# Patient Record
Sex: MALE | Race: WHITE | HISPANIC OR LATINO | URBAN - METROPOLITAN AREA
[De-identification: names, ages, dates, MRNs, and addresses within clinical notes are randomized per-mention and may not be internally consistent; named-entity substitution may affect disease eponyms.]

---

## 2018-03-31 ENCOUNTER — EMERGENCY (EMERGENCY)
Facility: HOSPITAL | Age: 83
LOS: 1 days | Discharge: HOME | End: 2018-03-31
Attending: EMERGENCY MEDICINE | Admitting: EMERGENCY MEDICINE
Payer: COMMERCIAL

## 2018-03-31 VITALS
RESPIRATION RATE: 18 BRPM | SYSTOLIC BLOOD PRESSURE: 133 MMHG | TEMPERATURE: 98 F | DIASTOLIC BLOOD PRESSURE: 64 MMHG | HEART RATE: 78 BPM | OXYGEN SATURATION: 97 %

## 2018-03-31 VITALS
HEART RATE: 66 BPM | DIASTOLIC BLOOD PRESSURE: 74 MMHG | RESPIRATION RATE: 14 BRPM | TEMPERATURE: 98 F | SYSTOLIC BLOOD PRESSURE: 158 MMHG | OXYGEN SATURATION: 99 %

## 2018-03-31 LAB
ALBUMIN SERPL ELPH-MCNC: 4 G/DL — SIGNIFICANT CHANGE UP (ref 3.3–5)
ALP SERPL-CCNC: 16 U/L — LOW (ref 40–120)
ALT FLD-CCNC: 13 U/L RC — SIGNIFICANT CHANGE UP (ref 10–45)
ANION GAP SERPL CALC-SCNC: 12 MMOL/L — SIGNIFICANT CHANGE UP (ref 5–17)
AST SERPL-CCNC: 16 U/L — SIGNIFICANT CHANGE UP (ref 10–40)
BASOPHILS # BLD AUTO: 0 K/UL — SIGNIFICANT CHANGE UP (ref 0–0.2)
BASOPHILS NFR BLD AUTO: 0.5 % — SIGNIFICANT CHANGE UP (ref 0–2)
BILIRUB SERPL-MCNC: 0.2 MG/DL — SIGNIFICANT CHANGE UP (ref 0.2–1.2)
BUN SERPL-MCNC: 26 MG/DL — HIGH (ref 7–23)
CALCIUM SERPL-MCNC: 9.1 MG/DL — SIGNIFICANT CHANGE UP (ref 8.4–10.5)
CHLORIDE SERPL-SCNC: 96 MMOL/L — SIGNIFICANT CHANGE UP (ref 96–108)
CO2 SERPL-SCNC: 22 MMOL/L — SIGNIFICANT CHANGE UP (ref 22–31)
CREAT SERPL-MCNC: 1.09 MG/DL — SIGNIFICANT CHANGE UP (ref 0.5–1.3)
EOSINOPHIL # BLD AUTO: 0.1 K/UL — SIGNIFICANT CHANGE UP (ref 0–0.5)
EOSINOPHIL NFR BLD AUTO: 1.5 % — SIGNIFICANT CHANGE UP (ref 0–6)
ERYTHROCYTE [SEDIMENTATION RATE] IN BLOOD: 22 MM/HR — HIGH (ref 0–20)
GAS PNL BLDV: SIGNIFICANT CHANGE UP
GLUCOSE SERPL-MCNC: 95 MG/DL — SIGNIFICANT CHANGE UP (ref 70–99)
HCT VFR BLD CALC: 31.1 % — LOW (ref 39–50)
HGB BLD-MCNC: 10.4 G/DL — LOW (ref 13–17)
LYMPHOCYTES # BLD AUTO: 2.2 K/UL — SIGNIFICANT CHANGE UP (ref 1–3.3)
LYMPHOCYTES # BLD AUTO: 42.1 % — SIGNIFICANT CHANGE UP (ref 13–44)
MCHC RBC-ENTMCNC: 30.6 PG — SIGNIFICANT CHANGE UP (ref 27–34)
MCHC RBC-ENTMCNC: 33.3 GM/DL — SIGNIFICANT CHANGE UP (ref 32–36)
MCV RBC AUTO: 92.1 FL — SIGNIFICANT CHANGE UP (ref 80–100)
MONOCYTES # BLD AUTO: 0.7 K/UL — SIGNIFICANT CHANGE UP (ref 0–0.9)
MONOCYTES NFR BLD AUTO: 13.3 % — SIGNIFICANT CHANGE UP (ref 2–14)
NEUTROPHILS # BLD AUTO: 2.3 K/UL — SIGNIFICANT CHANGE UP (ref 1.8–7.4)
NEUTROPHILS NFR BLD AUTO: 42.5 % — LOW (ref 43–77)
PLATELET # BLD AUTO: 207 K/UL — SIGNIFICANT CHANGE UP (ref 150–400)
POTASSIUM SERPL-MCNC: 4.7 MMOL/L — SIGNIFICANT CHANGE UP (ref 3.5–5.3)
POTASSIUM SERPL-SCNC: 4.7 MMOL/L — SIGNIFICANT CHANGE UP (ref 3.5–5.3)
PROT SERPL-MCNC: 7.7 G/DL — SIGNIFICANT CHANGE UP (ref 6–8.3)
RBC # BLD: 3.38 M/UL — LOW (ref 4.2–5.8)
RBC # FLD: 12.3 % — SIGNIFICANT CHANGE UP (ref 10.3–14.5)
SODIUM SERPL-SCNC: 130 MMOL/L — LOW (ref 135–145)
WBC # BLD: 5.3 K/UL — SIGNIFICANT CHANGE UP (ref 3.8–10.5)
WBC # FLD AUTO: 5.3 K/UL — SIGNIFICANT CHANGE UP (ref 3.8–10.5)

## 2018-03-31 PROCEDURE — 73140 X-RAY EXAM OF FINGER(S): CPT

## 2018-03-31 PROCEDURE — 85014 HEMATOCRIT: CPT

## 2018-03-31 PROCEDURE — 99285 EMERGENCY DEPT VISIT HI MDM: CPT | Mod: 25

## 2018-03-31 PROCEDURE — 83605 ASSAY OF LACTIC ACID: CPT

## 2018-03-31 PROCEDURE — 85027 COMPLETE CBC AUTOMATED: CPT

## 2018-03-31 PROCEDURE — 87205 SMEAR GRAM STAIN: CPT

## 2018-03-31 PROCEDURE — 86140 C-REACTIVE PROTEIN: CPT

## 2018-03-31 PROCEDURE — 82330 ASSAY OF CALCIUM: CPT

## 2018-03-31 PROCEDURE — 87070 CULTURE OTHR SPECIMN AEROBIC: CPT

## 2018-03-31 PROCEDURE — 85652 RBC SED RATE AUTOMATED: CPT

## 2018-03-31 PROCEDURE — 73140 X-RAY EXAM OF FINGER(S): CPT | Mod: 26,LT

## 2018-03-31 PROCEDURE — 87040 BLOOD CULTURE FOR BACTERIA: CPT

## 2018-03-31 PROCEDURE — 84295 ASSAY OF SERUM SODIUM: CPT

## 2018-03-31 PROCEDURE — 82947 ASSAY GLUCOSE BLOOD QUANT: CPT

## 2018-03-31 PROCEDURE — 99284 EMERGENCY DEPT VISIT MOD MDM: CPT | Mod: 25

## 2018-03-31 PROCEDURE — 80053 COMPREHEN METABOLIC PANEL: CPT

## 2018-03-31 PROCEDURE — 96374 THER/PROPH/DIAG INJ IV PUSH: CPT | Mod: XU

## 2018-03-31 PROCEDURE — 84132 ASSAY OF SERUM POTASSIUM: CPT

## 2018-03-31 PROCEDURE — 10061 I&D ABSCESS COMP/MULTIPLE: CPT

## 2018-03-31 PROCEDURE — 82803 BLOOD GASES ANY COMBINATION: CPT

## 2018-03-31 PROCEDURE — 87186 SC STD MICRODIL/AGAR DIL: CPT

## 2018-03-31 PROCEDURE — 82435 ASSAY OF BLOOD CHLORIDE: CPT

## 2018-03-31 RX ORDER — ACETAMINOPHEN 500 MG
2 TABLET ORAL
Qty: 100 | Refills: 0 | OUTPATIENT
Start: 2018-03-31 | End: 2018-04-13

## 2018-03-31 RX ORDER — PIPERACILLIN AND TAZOBACTAM 4; .5 G/20ML; G/20ML
3.38 INJECTION, POWDER, LYOPHILIZED, FOR SOLUTION INTRAVENOUS ONCE
Qty: 0 | Refills: 0 | Status: COMPLETED | OUTPATIENT
Start: 2018-03-31 | End: 2018-03-31

## 2018-03-31 RX ADMIN — Medication 1 TABLET(S): at 22:30

## 2018-03-31 RX ADMIN — PIPERACILLIN AND TAZOBACTAM 200 GRAM(S): 4; .5 INJECTION, POWDER, LYOPHILIZED, FOR SOLUTION INTRAVENOUS at 16:25

## 2018-03-31 RX ADMIN — Medication 1 TABLET(S): at 22:27

## 2018-03-31 NOTE — ED PROVIDER NOTE - OBJECTIVE STATEMENT
Pt is a 89 y/o male w/ a hx of BPH, CAD, CHF, and essential HTN, PVD, s/p primary angioplasty w/ coronary stent, and b/l BKA presents to the ED w/ an infxn secondary to a cut on his left hand and arm. Pt went to Urgent care yesterday and recommended him to go to the hospital. Denies any fever, chills, or N/V. Experiences no palpitation, chest pain, or SOB. No constipation or diarrhea. Denies dysuria or hematuria. Pt has had no recent travel and no sick contacts. Pt is a 89 y/o male w/ a hx of BPH, CAD, CHF, and essential HTN, PVD, s/p primary angioplasty w/ coronary stent, and b/l BKA presents to the ED w/ an infection to left 4th digit secondary to a cut on his left hand  while cooking last week. Pt went to PMD and Urgent care yesterday and recommended him to go to the hospital. Denies any fever, chills, or N/V. Experiences no palpitation, chest pain, or SOB. No constipation or diarrhea. Denies dysuria or hematuria. Pt has had no recent travel and no sick contacts. Pt is a 89 y/o male w/ a hx of BPH, CAD, CHF, and essential HTN, PVD, s/p primary angioplasty w/ coronary stent, and b/l BKA presents to the ED w/ an infection to left 4th digit secondary to a cut on his left hand  while cooking last week. Pt went to PMD and Urgent care yesterday and recommended him to go to the hospital. Denies any fever, chills, or N/V. Experiences no palpitation, chest pain, or SOB. No constipation or diarrhea. Denies dysuria or hematuria. Pt has had no recent travel and no sick contacts.  pmd: Jorge Bhakta: [phone]

## 2018-03-31 NOTE — ED PROVIDER NOTE - MUSCULOSKELETAL, MLM
Spine appears normal, range of motion is not limited, no muscle or joint tenderness, B/l BKA, no tenderness to other fingers on left hand. Has erythema to the mid distal phalange, redness to the CIP as well as the swelling to the PIP. 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 Spine appears normal, range of motion is not limited, no muscle or joint tenderness, B/l BKA, no tenderness to other fingers on left hand. Has erythema from middle phlanx to pip, + swelling to same area; +ttp to distal middle phalnx  amputation to dip

## 2018-03-31 NOTE — ED PROVIDER NOTE - PMH
Benign prostatic hypertrophy without lower urinary tract symptoms  BPH (benign prostatic hyperplasia)  BPH (Benign Prostatic Hyperplasia)    CAD (coronary artery disease)    CHF (congestive heart failure)    Essential hypertension  HTN (hypertension)  Hypertension    Peripheral vascular disease  PVD (peripheral vascular disease)  Status post primary angioplasty with coronary stent

## 2018-03-31 NOTE — ED PROVIDER NOTE - PSH
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

## 2018-03-31 NOTE — ED PROVIDER NOTE - PROGRESS NOTE DETAILS
ATTENDING MD Kaylene: d/w Dr. Pavon with resassuring labs and X-rays she agrees should I&D, instruct patient on soaks and DC home with f/u I&D performed and successful. patient and family ocunselled on wound care. will DC with augmentin and bactrim renally dosed. f/u with Dr. Pavon on monday. I have advised the patient on the usual course of this illness, an appropriate schedule for follow-up, and concerning signs and symptoms that should prompt return to the emergency department. I answered all questions to the best of my ability. The patient is stable for discharge.

## 2018-03-31 NOTE — ED PROVIDER NOTE - CARE PLAN
Principal Discharge DX:	Finger infection Principal Discharge DX:	Finger infection  Assessment and plan of treatment:	You were seen and evaluated in the emergency department for a finger infection. Your labs and imaging were reassuring. It is likely a localized infection and less likely to be osteomyelitis. You will need to follow up with a hand surgeon on Monday. Please see the attached information sheets for the phone number.    -Take antibiotics twice daily as prescribed (Augmentin and Bactrim)  -Take tylenol as needed for pain  -Soak your finger in a mixture of 75%sterile water 25% hydrogen peroxide for 20 minutes 4 times daily. Afterwards apply a clean dressing.    Please return for worsening pain, redness, fevers, chills, vomiting, or other worsening or concerning new symptoms.  Secondary Diagnosis:	Cellulitis and abscess of finger

## 2018-03-31 NOTE — ED PROVIDER NOTE - PLAN OF CARE
You were seen and evaluated in the emergency department for a finger infection. Your labs and imaging were reassuring. It is likely a localized infection and less likely to be osteomyelitis. You will need to follow up with a hand surgeon on Monday. Please see the attached information sheets for the phone number.    -Take antibiotics twice daily as prescribed (Augmentin and Bactrim)  -Take tylenol as needed for pain  -Soak your finger in a mixture of 75%sterile water 25% hydrogen peroxide for 20 minutes 4 times daily. Afterwards apply a clean dressing.    Please return for worsening pain, redness, fevers, chills, vomiting, or other worsening or concerning new symptoms.

## 2018-03-31 NOTE — ED PROCEDURE NOTE - PROCEDURE ADDITIONAL DETAILS
0.75 cm incision through cutaneous layer made dorsal/laterally to avoid tendons and neurovacsular bundle. care was taken to stay superficial. copious purulent material expressed. loculations broken up, further purulent material expressed. wound likely too tight for packing to be placed. wound was soaked per instructions from hand surgery in saline/hydrogen peroxide and then a sterile dressing was placed. pt to f/u with hand surgery as outpatient. advised on wound care and soaking.

## 2018-03-31 NOTE — ED PROVIDER NOTE - MEDICAL DECISION MAKING DETAILS
left 4th digit infection with mild fluctuance redness--will fu xray to eval for osteo; labs; abx--reassess

## 2018-03-31 NOTE — ED ADULT NURSE NOTE - OBJECTIVE STATEMENT
89 y/o Kitt[place] speaking male A&Ox4, refusing  phone, son translating bedside, PMH CAD, bilateral BKA, BPH, CKD, CHF, HTN, presents to ED with c/o 4th finger pain and swelling s/p sustaining laceration while cooking 1 week prior. As per pt's son, redness and swelling has worsened, was seen by outpt MD who recommended pt come to ED for further evaluation. Upon further assessment, airway patent and intact, denies chest pain/SOB. ABD soft nontender, denies n/v/d. Denies blood in urine and/or stool.  Peripheral pulses strong and normal baseline sensation present x4. Safety and comfort measures maintained.

## 2018-04-01 LAB — CRP SERPL-MCNC: 0.8 MG/DL — HIGH (ref 0–0.4)

## 2018-04-01 NOTE — ED POST DISCHARGE NOTE - ADDITIONAL DOCUMENTATION
Abscess Culture: moderate staph and enterobacter sensitive to bactrim. Patient discharged on bactrim.  -Theodore Olivera PA-C

## 2018-04-01 NOTE — ED POST DISCHARGE NOTE - OTHER COMMUNICATION
Spoke w/ pt and pt's son (caregiver) regarding the mildly elevated CRP result of 0.8 in setting of recent finger laceration and cellulitis. Son informed me he will call Yaneth Ferguson (gave number again from discharge note) tomorrow morning to schedule a f/u with hand as previously instructed to r/o osteo. Encouraged PMD f/u as well. Advised of strict return precautions. - DELMI PintoC Spoke w/ pt and pt's son (caregiver) regarding the mildly elevated CRP result of 0.8 in setting of recent finger laceration and cellulitis/abscess. Son informed me he will call Yaneth Ferguson (gave number again from discharge note) tomorrow morning to schedule a f/u with hand as previously instructed to r/o osteo. Encouraged PMD f/u as well. Advised of strict return precautions. - Kendall Sher PA-C

## 2018-04-02 LAB
-  AMIKACIN: SIGNIFICANT CHANGE UP
-  AMIKACIN: SIGNIFICANT CHANGE UP
-  AMOXICILLIN/CLAVULANIC ACID: SIGNIFICANT CHANGE UP
-  AMPICILLIN/SULBACTAM: SIGNIFICANT CHANGE UP
-  AMPICILLIN: SIGNIFICANT CHANGE UP
-  AZTREONAM: SIGNIFICANT CHANGE UP
-  AZTREONAM: SIGNIFICANT CHANGE UP
-  CEFAZOLIN: SIGNIFICANT CHANGE UP
-  CEFEPIME: SIGNIFICANT CHANGE UP
-  CEFEPIME: SIGNIFICANT CHANGE UP
-  CEFOXITIN: SIGNIFICANT CHANGE UP
-  CEFOXITIN: SIGNIFICANT CHANGE UP
-  CEFTAZIDIME: SIGNIFICANT CHANGE UP
-  CEFTRIAXONE: SIGNIFICANT CHANGE UP
-  CEFTRIAXONE: SIGNIFICANT CHANGE UP
-  CIPROFLOXACIN: SIGNIFICANT CHANGE UP
-  CLINDAMYCIN: SIGNIFICANT CHANGE UP
-  ERTAPENEM: SIGNIFICANT CHANGE UP
-  ERYTHROMYCIN: SIGNIFICANT CHANGE UP
-  GENTAMICIN: SIGNIFICANT CHANGE UP
-  IMIPENEM: SIGNIFICANT CHANGE UP
-  LEVOFLOXACIN: SIGNIFICANT CHANGE UP
-  MEROPENEM: SIGNIFICANT CHANGE UP
-  MEROPENEM: SIGNIFICANT CHANGE UP
-  MOXIFLOXACIN(AEROBIC): SIGNIFICANT CHANGE UP
-  OXACILLIN: SIGNIFICANT CHANGE UP
-  PENICILLIN: SIGNIFICANT CHANGE UP
-  PIPERACILLIN/TAZOBACTAM: SIGNIFICANT CHANGE UP
-  PIPERACILLIN/TAZOBACTAM: SIGNIFICANT CHANGE UP
-  RIFAMPIN: SIGNIFICANT CHANGE UP
-  TETRACYCLINE: SIGNIFICANT CHANGE UP
-  TOBRAMYCIN: SIGNIFICANT CHANGE UP
-  TRIMETHOPRIM/SULFAMETHOXAZOLE: SIGNIFICANT CHANGE UP
-  VANCOMYCIN: SIGNIFICANT CHANGE UP
METHOD TYPE: SIGNIFICANT CHANGE UP

## 2018-04-05 LAB
CULTURE RESULTS: SIGNIFICANT CHANGE UP
ORGANISM # SPEC MICROSCOPIC CNT: SIGNIFICANT CHANGE UP
SPECIMEN SOURCE: SIGNIFICANT CHANGE UP